# Patient Record
Sex: MALE | Race: WHITE
[De-identification: names, ages, dates, MRNs, and addresses within clinical notes are randomized per-mention and may not be internally consistent; named-entity substitution may affect disease eponyms.]

---

## 2021-12-15 ENCOUNTER — HOSPITAL ENCOUNTER (EMERGENCY)
Dept: HOSPITAL 97 - ER | Age: 26
Discharge: HOME | End: 2021-12-15
Payer: SELF-PAY

## 2021-12-15 VITALS — DIASTOLIC BLOOD PRESSURE: 96 MMHG | SYSTOLIC BLOOD PRESSURE: 134 MMHG | TEMPERATURE: 98.5 F | OXYGEN SATURATION: 100 %

## 2021-12-15 DIAGNOSIS — F41.8: ICD-10-CM

## 2021-12-15 DIAGNOSIS — F41.0: Primary | ICD-10-CM

## 2021-12-15 PROCEDURE — 99282 EMERGENCY DEPT VISIT SF MDM: CPT

## 2021-12-15 NOTE — ER
Nurse's Notes                                                                                     

 [facility]                                                                 

Name: Trevin Jaquez                                                                               

Age: 26 yrs                                                                                       

Sex: Male                                                                                         

: 1995                                                                                   

MRN: [id number]                                                                                   

Arrival Date: 12/15/2021                                                                          

Time: 20:47                                                                                       

Account#: [id number]                                                                            

Bed 10                                                                                            

Private MD:                                                                                       

Diagnosis: Panic disorder [episodic paroxysmal anxiety] without agoraphobia                       

                                                                                                  

Presentation:                                                                                     

12/15                                                                                             

20:50 Chief complaint: Patient states: 9 days no Kratum; feeling anxious, mood swings. Prior  5 

      was using approx 5-10grams Kratum daily and stopped 9 days ago. Pt states he hasn't         

      been able to sleep more than an hour or two a night. "My anxiety is beyond awful at         

      this point, it's preventing me from leaving my apartment most days". Coronavirus            

      screen: Vaccine status: Patient reports being unvaccinated. Client denies travel out of     

      the [place] in the last 14 days. Ebola Screen: Patient negative for fever greater than or      

      equal to 101.5 degrees Fahrenheit, and additional compatible Ebola Virus Disease            

      symptoms Patient denies exposure to infectious person. Patient denies travel to an          

      Ebola-affected area in the 21 days before illness onset. Initial Sepsis Screen: Does        

      the patient meet any 2 criteria? HR > 90 bpm. Initial Sepsis Screen: Does the patient       

      have a suspected source of infection? No. Patient's initial sepsis screen is negative.      

      Risk Assessment: Do you want to hurt yourself or someone else? Patient reports no           

      desire to harm self or others. Onset of symptoms was 2021.                     

20:50 Method Of Arrival: Ambulatory                                                           [facility] 

20:50 Acuity: LA 4                                                                           5 

                                                                                                  

Triage Assessment:                                                                                

20:55 General: Appears uncomfortable, well groomed, well developed, well nourished, Behavior  [facility] 

      is calm, cooperative, appropriate for age, anxious. Pain: Denies pain.                      

                                                                                                  

Historical:                                                                                       

- Allergies:                                                                                      

20:54 No Known Allergies;                                                                     jh5 

- PMHx:                                                                                           

20:54 Anxiety; Depressive disorder;                                                           [facility] 

                                                                                                  

- Immunization history:: Adult Immunizations up to date.                                          

- Social history:: Smoking status: Patient denies any tobacco usage or history of.                

                                                                                                  

                                                                                                  

Screenin:08 Abuse screen: Denies threats or abuse. Denies injuries from another. Nutritional        [facility] 

      screening: No deficits noted. Tuberculosis screening: No symptoms or risk factors           

      identified. Fall Risk None identified.                                                      

                                                                                                  

Assessment:                                                                                       

21:03 Reassessment: see triage. General: Behavior is anxious. Neuro: Level of Consciousness   5 

      is awake, alert, obeys commands, Oriented to person, place, time, situation,                

      Appropriate for age. Cardiovascular: Capillary refill < 3 seconds Patient's skin is         

      warm and dry. Respiratory: No deficits noted.                                               

                                                                                                  

Vital Signs:                                                                                      

20:50  / 96; Pulse 98; Resp 18; Temp 98.5; Pulse Ox 100% ; Weight 108.86 kg; Height 6   5 

      ft. 0 in. (182.88 cm);                                                                      

20:50 Body Mass Index 32.55 (108.86 kg, 182.88 cm)                                            5 

                                                                                                  

ED Course:                                                                                        

20:47 Patient arrived in ED.                                                                  bp1 

20:54 Triage completed.                                                                       5 

21:02 Efrem Macario PA is PHCP.                                                               jr8 

21:02 Jeanmarie Garcia MD is Attending Physician.                                                jr8 

21:09 Patient has correct armband on for positive identification. Call light in reach. Side   5 

      rails up X 1.                                                                               

                                                                                                  

Administered Medications:                                                                         

21:10 Drug: XANax (alprazolam) Tablet 1 mg Route: PO;                                         5 

                                                                                                  

                                                                                                  

Outcome:                                                                                          

21:09 Discharge ordered by MD.                                                                jr8 

21:15 Patient left the ED.                                                                    [facility] 

                                                                                                  

Signatures:                                                                                       

Efrem Macario PA PA   [facility]                                                  

Angelina Nobles Jessica, RN                       RN   [facility]                                                  

                                                                                                  

**************************************************************************************************

## 2021-12-15 NOTE — EDPHYS
Physician Documentation                                                                           

 [facility]                                                                 

Name: Trevin Jaquez                                                                               

Age: 26 yrs                                                                                       

Sex: Male                                                                                         

: 1995                                                                                   

MRN: [id number]                                                                                   

Arrival Date: 12/15/2021                                                                          

Time: 20:47                                                                                       

Account#: [id number]                                                                            

Bed 10                                                                                            

Private MD:                                                                                       

ED Physician Jeanmarie Garcia                                                                         

HPI:                                                                                              

12/15                                                                                             

21:09 This 26 yrs old Male presents to ER via Ambulatory with complaints of Anxiety.          jr8 

21:09 26-year-old male presents with increased anxiety for the past 10 days. Patient states   jr8 

      he is stopped using kratom 10 days ago and began experiencing increased anxiety after.      

      Patient has a history of anxiety and depression and takes Zoloft. Patient states has        

      not been able to sleep for more than an hour for the past 2 nights due to anxiety.          

      Patient denies fever, chest pain, or shortness of breath..                                  

                                                                                                  

Historical:                                                                                       

- Allergies:                                                                                      

20:54 No Known Allergies;                                                                     jh5 

- PMHx:                                                                                           

20:54 Anxiety; Depressive disorder;                                                           5 

                                                                                                  

- Immunization history:: Adult Immunizations up to date.                                          

- Social history:: Smoking status: Patient denies any tobacco usage or history of.                

                                                                                                  

                                                                                                  

ROS:                                                                                              

21:09 Eyes: Negative for injury, pain, redness, and discharge, ENT: Negative for injury,      jr8 

      pain, and discharge, Neck: Negative for injury, pain, and swelling, Cardiovascular:         

      Negative for chest pain, palpitations, and edema, Respiratory: Negative for shortness       

      of breath, cough, wheezing, and pleuritic chest pain, Abdomen/GI: Negative for              

      abdominal pain, nausea, vomiting, diarrhea, and constipation, Back: Negative for injury     

      and pain, MS/Extremity: Negative for injury and deformity, Skin: Negative for injury,       

      rash, and discoloration, Neuro: Negative for headache, weakness, numbness, tingling,        

      and seizure.                                                                                

21:09 Psych: Positive for anxiety.                                                                

                                                                                                  

Exam:                                                                                             

21:09 Constitutional:  This is a well developed, well nourished patient who is awake, alert,  jr8 

      and in no acute distress. Cardiovascular:  Regular rate and rhythm with a normal S1 and     

      S2.  No gallops, murmurs, or rubs.  Normal PMI, no JVD.  No pulse deficits.                 

      Respiratory:  Lungs have equal breath sounds bilaterally, clear to auscultation and         

      percussion.  No rales, rhonchi or wheezes noted.  No increased work of breathing, no        

      retractions or nasal flaring. Skin:  Warm, dry with normal turgor.  Normal color with       

      no rashes, no lesions, and no evidence of cellulitis. MS/ Extremity:  Pulses equal, no      

      cyanosis.  Neurovascular intact.  Full, normal range of motion. Neuro:  Awake and           

      alert, GCS 15, oriented to person, place, time, and situation.  Cranial nerves II-XII       

      grossly intact.  Motor strength 5/5 in all extremities.  Sensory grossly intact.            

      Cerebellar exam normal.  Normal gait. Psych:  Awake, alert, with orientation to person,     

      place and time.  Behavior, mood, and affect are within normal limits.                       

                                                                                                  

Vital Signs:                                                                                      

20:50  / 96; Pulse 98; Resp 18; Temp 98.5; Pulse Ox 100% ; Weight 108.86 kg; Height 6   [facility] 

      ft. 0 in. (182.88 cm);                                                                      

20:50 Body Mass Index 32.55 (108.86 kg, 182.88 cm)                                            [facility] 

                                                                                                  

MDM:                                                                                              

21:02 Patient medically screened.                                                             jr8 

21:08 Data reviewed: vital signs, nurses notes, and as a result, I will discharge patient.    [facility] 

      Data interpreted: Pulse oximetry: on room air is 100 %. Interpretation: normal.             

      Counseling: I had a detailed discussion with the patient and/or guardian regarding: the     

      historical points, exam findings, and any diagnostic results supporting the                 

      discharge/admit diagnosis, the need for outpatient follow up, a family practitioner, to     

      return to the emergency department if symptoms worsen or persist or if there are any        

      questions or concerns that arise at home.                                                   

                                                                                                  

Administered Medications:                                                                         

21:10 Drug: XANax (alprazolam) Tablet 1 mg Route: PO;                                         [facility] 

                                                                                                  

                                                                                                  

Disposition:                                                                                      

                                                                                             

19:42 Co-signature as Attending Physician, Efrem BRIGHT I agree with the assessment and     sp3 

      plan of care.                                                                               

                                                                                                  

Disposition Summary:                                                                              

12/15/21 21:09                                                                                    

Discharge Ordered                                                                                 

      Location: Home                                                                          [facility] 

      Problem: new                                                                            jr8 

      Symptoms: have improved                                                                 jr8 

      Condition: Stable                                                                       jr8 

      Diagnosis                                                                                   

        - Panic disorder [episodic paroxysmal anxiety] without agoraphobia                    jr8 

      Followup:                                                                               jr8 

        - With: Private Physician                                                                  

        - When: 5 - 6 days                                                                         

        - Reason: Recheck today's complaints, Continuance of care, Re-evaluation by your           

      physician                                                                                   

      Discharge Instructions:                                                                     

        - Discharge Summary Sheet                                                             jr8 

        - Panic Attack                                                                        jr8 

      Forms:                                                                                      

        - Medication Reconciliation Form                                                      jr8 

        - Thank You Letter                                                                    jr8 

        - Antibiotic Education                                                                jr8 

        - Prescription Opioid Use                                                             jr8 

      Prescriptions:                                                                              

        - Hydroxyzine HCl 50 mg Oral Tablet                                                        

            - take 1 tablet by ORAL route every 8 hours As needed; 20 tablet; Refills: 0,     jr8 

      Product Selection Permitted                                                                 

Signatures:                                                                                       

Efrem Macario PA PA   jr8                                                  

Jeanmarie Garcia MD MD   sp3                                                  

Kalyani Benoit RN                       RN   jh5                                                  

                                                                                                  

**************************************************************************************************

## 2021-12-21 ENCOUNTER — HOSPITAL ENCOUNTER (EMERGENCY)
Dept: HOSPITAL 97 - ER | Age: 26
Discharge: HOME | End: 2021-12-21
Payer: SELF-PAY

## 2021-12-21 VITALS — SYSTOLIC BLOOD PRESSURE: 138 MMHG | DIASTOLIC BLOOD PRESSURE: 100 MMHG | TEMPERATURE: 98.5 F | OXYGEN SATURATION: 95 %

## 2021-12-21 DIAGNOSIS — F32.A: ICD-10-CM

## 2021-12-21 DIAGNOSIS — F41.9: Primary | ICD-10-CM

## 2021-12-21 LAB
ALBUMIN SERPL BCP-MCNC: 4.1 G/DL (ref 3.4–5)
ALP SERPL-CCNC: 96 U/L (ref 45–117)
ALT SERPL W P-5'-P-CCNC: 58 U/L (ref 12–78)
AST SERPL W P-5'-P-CCNC: 21 U/L (ref 15–37)
BUN BLD-MCNC: 11 MG/DL (ref 7–18)
GLUCOSE SERPLBLD-MCNC: 94 MG/DL (ref 74–106)
HCT VFR BLD CALC: 45.8 % (ref 39.6–49)
INR BLD: 1.05
LYMPHOCYTES # SPEC AUTO: 3.5 K/UL (ref 0.7–4.9)
METHAMPHET UR QL SCN: NEGATIVE
PMV BLD: 8.6 FL (ref 7.6–11.3)
POTASSIUM SERPL-SCNC: 3.8 MMOL/L (ref 3.5–5.1)
RBC # BLD: 5.25 M/UL (ref 4.33–5.43)
THC SERPL-MCNC: POSITIVE NG/ML

## 2021-12-21 PROCEDURE — 85730 THROMBOPLASTIN TIME PARTIAL: CPT

## 2021-12-21 PROCEDURE — 80320 DRUG SCREEN QUANTALCOHOLS: CPT

## 2021-12-21 PROCEDURE — 93005 ELECTROCARDIOGRAM TRACING: CPT

## 2021-12-21 PROCEDURE — 96374 THER/PROPH/DIAG INJ IV PUSH: CPT

## 2021-12-21 PROCEDURE — 36415 COLL VENOUS BLD VENIPUNCTURE: CPT

## 2021-12-21 PROCEDURE — 80307 DRUG TEST PRSMV CHEM ANLYZR: CPT

## 2021-12-21 PROCEDURE — 85610 PROTHROMBIN TIME: CPT

## 2021-12-21 PROCEDURE — 80048 BASIC METABOLIC PNL TOTAL CA: CPT

## 2021-12-21 PROCEDURE — 80076 HEPATIC FUNCTION PANEL: CPT

## 2021-12-21 PROCEDURE — 85025 COMPLETE CBC W/AUTO DIFF WBC: CPT

## 2021-12-21 PROCEDURE — 99284 EMERGENCY DEPT VISIT MOD MDM: CPT

## 2021-12-21 PROCEDURE — 81003 URINALYSIS AUTO W/O SCOPE: CPT

## 2021-12-21 PROCEDURE — 80329 ANALGESICS NON-OPIOID 1 OR 2: CPT

## 2021-12-21 NOTE — ER
Nurse's Notes                                                                                     

 [facility]                                                                 

Name: Trevin Jaquez                                                                               

Age: 26 yrs                                                                                       

Sex: Male                                                                                         

: 1995                                                                                   

MRN: [id number]                                                                                   

Arrival Date: 2021                                                                          

Time: 16:25                                                                                       

Account#: [id number]                                                                            

Bed 5                                                                                             

Private MD:                                                                                       

Diagnosis: Anxiety disorder, unspecified                                                          

                                                                                                  

Presentation:                                                                                     

                                                                                             

16:30 Chief complaint: Patient states: states that he recently stopped taking "Kratom".       ap3 

      Patient reports having been on it for a few months, but states that he hasn't taken it      

      in 17-18 days. Patient states that he is very anxious now, has trouble sleeping and is      

      having nightmares. Coronavirus screen: At this time, the client does not indicate any       

      symptoms associated with coronavirus-19. Ebola Screen: No symptoms or risks identified      

      at this time. Initial Sepsis Screen: Does the patient meet any 2 criteria? No.              

      Patient's initial sepsis screen is negative. Does the patient have a suspected source       

      of infection? No. Patient's initial sepsis screen is negative. Risk Assessment: Do you      

      want to hurt yourself or someone else? Patient reports no desire to harm self or            

      others. Onset of symptoms was 2021.                                            

16:30 Method Of Arrival: Ambulatory                                                           ap3 

16:36 Acuity: LA 3                                                                           ap3 

                                                                                                  

Triage Assessment:                                                                                

16:35 General: Appears in no apparent distress. Behavior is cooperative, anxious. Pain:       ap3 

      Denies pain. Neuro: Level of Consciousness is awake, alert, obeys commands, Oriented to     

      person, place, time, situation, Appropriate for age Speech is normal. Neuro:.               

      Cardiovascular: Reports palpitations. Respiratory: Airway is patent Respiratory effort      

      is even, unlabored.                                                                         

                                                                                                  

Historical:                                                                                       

- Allergies:                                                                                      

16:34 No Known Allergies;                                                                     ap3 

- Home Meds:                                                                                      

16:34 Zoloft 100 mg Oral tab [Active];                                                        ap3 

- PMHx:                                                                                           

16:34 Anxiety; depressive disorder;                                                           ap3 

                                                                                                  

- Immunization history:: Client reports having NOT received the Covid vaccine.                    

- Social history:: Smoking status: Reported history of juuling and/or vaping.                     

                                                                                                  

                                                                                                  

Screenin:36 Abuse screen: Denies threats or abuse. Nutritional screening: No deficits noted.        ap3 

      Tuberculosis screening: No symptoms or risk factors identified. Fall Risk None              

      identified.                                                                                 

                                                                                                  

Assessment:                                                                                       

16:35 General: SEE TRIAGE NOTE.                                                               bp  

17:30 Reassessment: No changes from previously documented assessment. Patient and/or family   bp  

      updated on plan of care and expected duration. Pain level reassessed.                       

18:24 Reassessment: No changes from previously documented assessment. Patient and/or family   bp  

      updated on plan of care and expected duration. Pain level reassessed. ALL CURRENT           

      ORDERS COMPLETE.                                                                            

19:01 Reassessment: PT D/C HOME AMBULATORY WITH FAMILY, DX WITH ***.                          bp  

                                                                                                  

Vital Signs:                                                                                      

16:30 Pulse 102; Resp 19; Temp 99.1(TE); Pulse Ox 99% on R/A; Weight 108.86 kg; Height 6 ft.  ap3 

      (182.88 cm);                                                                                

16:36  / 107;                                                                           ap3 

17:30  / 108; Pulse 71; Resp 16; Pulse Ox 96% ;                                         bp  

18:23  / 107; Pulse 94; Resp 18; Pulse Ox 96% ;                                         bp  

19:18  / 100; Pulse 94; Resp 18; Temp 98.5; Pulse Ox 95% on R/A;                        pineda  

16:30 Body Mass Index 32.55 (108.86 kg, 182.88 cm)                                            ap3 

                                                                                                  

ED Course:                                                                                        

16:25 Patient arrived in ED.                                                                  rg4 

16:35 Patient has correct armband on for positive identification. Placed in gown. Bed in low  bp  

      position. Call light in reach. Side rails up X2. Adult w/ patient.                          

16:36 Triage completed.                                                                       ap3 

16:36 Arm band placed on left wrist.                                                          ap3 

16:42 [facility]                                                                cp  

16:42 Avni Branch MD is Attending Physician.                                             cp  

16:52 EKG done, by ED staff, reviewed by Avni BRIGHT.                                       dh3 

17:16 Inserted saline lock: 20 gauge in left antecubital area, using aseptic technique. Blood dh4 

      collected.                                                                                  

17:30 Marciano Fonseca, SAW is Primary Nurse.                                                    bp  

19:02 No provider procedures requiring assistance completed. IV discontinued, intact,         bp  

      bleeding controlled, No redness/swelling at site. Pressure dressing applied.                

                                                                                                  

Administered Medications:                                                                         

18:00 Drug: Ativan (LORazepam) 1 mg Route: IVP; Site: right forearm;                          bp  

19:24 Follow up: Response: No adverse reaction; Marked relief of symptoms; Anxiety decreased  pineda  

                                                                                                  

                                                                                                  

Outcome:                                                                                          

18:44 Discharge ordered by MD.                                                                cp  

19:04 Discharged to home ambulatory, with family.                                             bp  

19:04 Condition: stable                                                                           

19:04 Discharge instructions given to patient, Instructed on discharge instructions, follow       

      up and referral plans. Demonstrated understanding of instructions, follow-up care.          

19:23 Patient left the ED.                                                                    pineda  

                                                                                                  

Signatures:                                                                                       

Avni Mata PA PA cp Garcia, Rubi                                 4                                                  

Nae Cardona                              3                                                  

Marciano Fonseca RN                      RN   bp                                                   

Renee Elam RN                    RN   3                                                  

Nico Eduardo                                 [facility]                                                  

Jeanne Arzate RN                   RN   pineda                                                   

                                                                                                  

**************************************************************************************************

## 2021-12-21 NOTE — EDPHYS
Physician Documentation                                                                           

 [facility]                                                                 

Name: Trevin Jaquez                                                                               

Age: 26 yrs                                                                                       

Sex: Male                                                                                         

: 1995                                                                                   

MRN: [id number]                                                                                   

Arrival Date: 2021                                                                          

Time: 16:25                                                                                       

Account#: [id number]                                                                            

Bed 5                                                                                             

Private MD:                                                                                       

ED Physician Avni Branch                                                                      

HPI:                                                                                              

                                                                                             

16:55 This 26 yrs old Male presents to ER via Ambulatory with complaints of Anxiety.          cp  

16:55 The patient presents to the emergency department with anxiety, over unknown             cp  

      circumstances, difficulty sleeping.                                                         

16:55 Onset: The symptoms/episode began/occurred gradually. Past psychiatric history: Prior   cp  

      diagnosis: depression, Anxiety, Psychiatric medications include: Zoloft. Associated         

      signs and symptoms: Pertinent positives; admits to use of marijuana and synthetic           

      marijuana use, Pertinent negatives: abdominal pain, chest pain, delusions, fever,           

      hallucinations, homicidal ideation, paranoia, suicide ideation. Patient reports moving      

      to [facility] about 1 year ago. Does not have family physician and/or                

      psychiatrist to treat anxiety. Denies any suicidal and/or homicidal thoughts.               

                                                                                                  

Historical:                                                                                       

- Allergies:                                                                                      

16:34 No Known Allergies;                                                                     ap3 

- Home Meds:                                                                                      

16:34 Zoloft 100 mg Oral tab [Active];                                                        ap3 

- PMHx:                                                                                           

16:34 Anxiety; depressive disorder;                                                           ap3 

                                                                                                  

- Immunization history:: Client reports having NOT received the Covid vaccine.                    

- Social history:: Smoking status: Reported history of juuling and/or vaping.                     

                                                                                                  

                                                                                                  

ROS:                                                                                              

17:00 Constitutional: Negative for body aches, chills, fever, poor PO intake.                 cp  

17:00 Cardiovascular: Negative for chest pain, edema, palpitations.                               

17:00 Eyes: Negative for injury, pain, redness, and discharge.                                cp  

17:00 ENT: Negative for ear pain, sore throat, difficulty swallowing, difficulty handling         

      secretions.                                                                                 

17:00 Respiratory: Negative for cough, shortness of breath, wheezing.                             

17:00 Abdomen/GI: Negative for abdominal pain, nausea, vomiting, and diarrhea.                    

17:00 Neuro: Negative for altered mental status, headache.                                        

17:00 Psych: Positive for anxiety, Negative for auditory hallucinations, visual                   

      hallucinations, homicidal ideation, suicide gesture, suicidal ideation.                     

17:00 All other systems are negative.                                                             

                                                                                                  

Exam:                                                                                             

16:54 ECG was reviewed by the Attending Physician.                                            cp  

17:05 Constitutional: The patient appears in no acute distress, alert, awake,                 cp  

      non-diaphoretic, non-toxic, well developed, well nourished, obese.                          

17:05 Head/Face:  Normocephalic, atraumatic.                                                  cp  

17:05 Eyes: Periorbital structures: appear normal, Conjunctiva: normal, no exudate, no            

      injection, Sclera: no appreciated abnormality, Lids and lashes: appear normal,              

      bilaterally.                                                                                

17:05 ENT: External ear(s): are unremarkable, Nose: is normal, Mouth: Lips: moist, Oral           

      mucosa: moist, Posterior pharynx: Airway: no evidence of obstruction, patent.               

17:05 Neck: ROM/movement: is normal, is supple, without pain, no range of motions limitations.    

17:05 Chest/axilla: Inspection: normal, Palpation: is normal, no crepitus, no tenderness.         

17:05 Cardiovascular: Rate: tachycardic, Rhythm: regular.                                         

17:05 Respiratory: the patient does not display signs of respiratory distress,  Respirations:     

      normal, no use of accessory muscles, no retractions, labored breathing, is not present,     

      Breath sounds: are clear throughout, no decreased breath sounds.                            

17:05 Abdomen/GI: Inspection: abdomen appears normal, Palpation: abdomen is soft and              

      non-tender, in all quadrants.                                                               

17:05 Neuro: Orientation: to person, place \T\ time. Mentation: is normal.                        

17:05 Psych: Behavior/mood is pleasant, cooperative, Patient has no thoughts/intents to harm      

      self or others. Judgement / Insight is normal. Delusions/hallucinations are not             

      present.                                                                                    

                                                                                                  

Vital Signs:                                                                                      

16:30 Pulse 102; Resp 19; Temp 99.1(TE); Pulse Ox 99% on R/A; Weight 108.86 kg; Height 6 ft.  ap3 

      (182.88 cm);                                                                                

16:36  / 107;                                                                           ap3 

17:30  / 108; Pulse 71; Resp 16; Pulse Ox 96% ;                                         bp  

18:23  / 107; Pulse 94; Resp 18; Pulse Ox 96% ;                                         bp  

19:18  / 100; Pulse 94; Resp 18; Temp 98.5; Pulse Ox 95% on R/A;                        pineda  

16:30 Body Mass Index 32.55 (108.86 kg, 182.88 cm)                                            ap3 

                                                                                                  

MDM:                                                                                              

16:48 Patient medically screened.                                                             hunter 

17:10 Differential diagnosis: drug withdrawal. acute psychotic break, depression, psychosis   cp  

      secondary to non-compliance.                                                                

18:44 Data reviewed: vital signs, nurses notes, lab test result(s), EKG, and as a result, I     

      will discharge patient.                                                                     

                                                                                                  

                                                                                             

16:51 Order name: Acetaminophen; Complete Time: 18:42                                         cp  

                                                                                             

16:51 Order name: Basic Metabolic Panel; Complete Time: 18:42                                 cp  

                                                                                             

18:42 Interpretation: Normal except: ; CO2 20.                                          cp  

                                                                                             

16:51 Order name: CBC with Diff; Complete Time: 17:43                                         cp  

                                                                                             

17:43 Interpretation: Normal except: WBC 11.90.                                               cp  

                                                                                             

16:51 Order name: ETOH Level; Complete Time: 18:42                                            cp  

                                                                                             

16:51 Order name: Hepatic Function; Complete Time: 18:42                                      cp  

                                                                                             

18:43 Interpretation: Normal except: GLOB 3.8.                                                  

                                                                                             

16:51 Order name: PT-INR; Complete Time: 17:43                                                cp  

                                                                                             

16:51 Order name: Ptt, Activated; Complete Time: 17:43                                        cp  

                                                                                             

16:51 Order name: Salicylate; Complete Time: 18:42                                            cp  

                                                                                             

16:51 Order name: Urine Drug Screen; Complete Time: 18:42                                     cp  

                                                                                             

18:42 Interpretation: Normal except: THC POSITIVE.                                            cp  

                                                                                             

16:51 Order name: EKG; Complete Time: 16:52                                                   cp  

                                                                                             

16:51 Order name: EKG - Nurse/Tech; Complete Time: 16:53                                      cp  

                                                                                             

16:51 Order name: IV Saline Lock; Complete Time: 17:16                                        cp  

                                                                                             

17:56 Order name: Urine Dipstick-Ancillary; Complete Time: 18:42                              EDMS

                                                                                             

16:51 Order name: Labs collected and sent; Complete Time: 17:16                               cp  

                                                                                             

16:51 Order name: Urine Dipstick-Ancillary (obtain specimen); Complete Time: 18:21            cp  

                                                                                                  

EC:54 Rate is 92 beats/min. Rhythm is regular. SC interval is normal. QRS interval is normal. cp  

      QT interval is normal. T waves are Inverted in lead III. Interpreted by me. Reviewed by     

      me.                                                                                         

                                                                                                  

Administered Medications:                                                                         

18:00 Drug: Ativan (LORazepam) 1 mg Route: IVP; Site: right forearm;                          bp  

19:24 Follow up: Response: No adverse reaction; Marked relief of symptoms; Anxiety decreased  pineda  

                                                                                                  

                                                                                                  

Disposition Summary:                                                                              

21 18:44                                                                                    

Discharge Ordered                                                                                 

      Location: Home                                                                          cp  

      Problem: new                                                                            cp  

      Symptoms: have improved                                                                 cp  

      Condition: Stable                                                                       cp  

      Diagnosis                                                                                   

        - Anxiety disorder, unspecified                                                       cp  

      Followup:                                                                               cp  

        - With: Private Physician                                                                  

        - When: 2 - 3 days                                                                         

        - Reason: Recheck today's complaints                                                       

      Discharge Instructions:                                                                     

        - Discharge Summary Sheet                                                             cp  

        - Panic Attack                                                                        cp  

        - Generalized Anxiety Disorder, Adult                                                 cp  

      Forms:                                                                                      

        - Medication Reconciliation Form                                                      cp  

        - Thank You Letter                                                                    cp  

        - Antibiotic Education                                                                cp  

        - Prescription Opioid Use                                                             cp  

        - Work release form                                                                   [id number] 

      Prescriptions:                                                                              

        - Ativan 1 mg Oral Tablet                                                                  

            - take 1 tablet by ORAL route every 12 hours As needed; 10 tablet; Refills: 0,    cp  

      Product Selection Permitted                                                                 

Addendum:                                                                                         

2021                                                                                        

     12:41 Co-signature as Attending Physician, Avni Branch MD I agree with the assessment and  c
ha

           plan of care.                                                                          

                                                                                                  

Signatures:                                                                                       

Dispatcher [employer]                           EDAvni Naranjo MD MD cha Page, Corey, PA                         PA   cp                                                   

Marciano Fonseca RN                      RN   bp                                                   

Renee Elam RN                    RN   ap3                                                  

Jeanne Arzate RN   pineda                                                   

                                                                                                  

Corrections: (The following items were deleted from the chart)                                    

                                                                                             

16:53 16:51 Suicide Screening ([place]) ordered. cp                                          dh3 

                                                                                                  

**************************************************************************************************